# Patient Record
Sex: FEMALE | Race: WHITE | URBAN - METROPOLITAN AREA
[De-identification: names, ages, dates, MRNs, and addresses within clinical notes are randomized per-mention and may not be internally consistent; named-entity substitution may affect disease eponyms.]

---

## 2017-12-28 ENCOUNTER — IMPORTED ENCOUNTER (OUTPATIENT)
Dept: URBAN - METROPOLITAN AREA CLINIC 50 | Facility: CLINIC | Age: 72
End: 2017-12-28

## 2018-01-12 ENCOUNTER — IMPORTED ENCOUNTER (OUTPATIENT)
Dept: URBAN - METROPOLITAN AREA CLINIC 50 | Facility: CLINIC | Age: 73
End: 2018-01-12

## 2018-01-12 NOTE — PATIENT DISCUSSION
"""MRx given"" ""Informed patient that their cataract(s) are not visually significant or do not meet the criteria for cataract surgery.  Recommended attention to common cataract symptoms

## 2019-07-15 ENCOUNTER — IMPORTED ENCOUNTER (OUTPATIENT)
Dept: URBAN - METROPOLITAN AREA CLINIC 50 | Facility: CLINIC | Age: 74
End: 2019-07-15

## 2019-07-22 ENCOUNTER — IMPORTED ENCOUNTER (OUTPATIENT)
Dept: URBAN - METROPOLITAN AREA CLINIC 50 | Facility: CLINIC | Age: 74
End: 2019-07-22

## 2019-09-19 ENCOUNTER — IMPORTED ENCOUNTER (OUTPATIENT)
Dept: URBAN - METROPOLITAN AREA CLINIC 50 | Facility: CLINIC | Age: 74
End: 2019-09-19

## 2019-09-24 ENCOUNTER — IMPORTED ENCOUNTER (OUTPATIENT)
Dept: URBAN - METROPOLITAN AREA CLINIC 50 | Facility: CLINIC | Age: 74
End: 2019-09-24

## 2019-09-25 ENCOUNTER — IMPORTED ENCOUNTER (OUTPATIENT)
Dept: URBAN - METROPOLITAN AREA CLINIC 50 | Facility: CLINIC | Age: 74
End: 2019-09-25

## 2019-09-25 NOTE — PATIENT DISCUSSION
"""Recommend Bilateral Upper Lid Blepharoplasty 10/01/2019 at [facility].  Discussed with patient ENRIQUE

## 2019-10-09 ENCOUNTER — IMPORTED ENCOUNTER (OUTPATIENT)
Dept: URBAN - METROPOLITAN AREA CLINIC 50 | Facility: CLINIC | Age: 74
End: 2019-10-09

## 2019-11-14 ENCOUNTER — IMPORTED ENCOUNTER (OUTPATIENT)
Dept: URBAN - METROPOLITAN AREA CLINIC 50 | Facility: CLINIC | Age: 74
End: 2019-11-14

## 2020-03-04 ENCOUNTER — IMPORTED ENCOUNTER (OUTPATIENT)
Dept: URBAN - METROPOLITAN AREA CLINIC 50 | Facility: CLINIC | Age: 75
End: 2020-03-04

## 2020-03-11 ENCOUNTER — IMPORTED ENCOUNTER (OUTPATIENT)
Dept: URBAN - METROPOLITAN AREA CLINIC 50 | Facility: CLINIC | Age: 75
End: 2020-03-11

## 2020-07-17 ENCOUNTER — IMPORTED ENCOUNTER (OUTPATIENT)
Dept: URBAN - METROPOLITAN AREA CLINIC 50 | Facility: CLINIC | Age: 75
End: 2020-07-17

## 2021-04-17 ASSESSMENT — VISUAL ACUITY
OS_CC: 20/25-1
OD_SC: 20/60-1
OD_OTHER: 20/40. 20/40.
OD_OTHER: 20/60. 20/70.
OS_CC: 20/20-1
OD_CC: 20/25-1
OS_CC: J1+@ 16 IN
OS_CC: 20/25-
OS_CC: J2@ 18 IN
OD_CC: J2@ 18 IN
OS_CC: J1
OD_OTHER: 20/50. 20/70.
OD_CC: 20/20-2
OS_CC: 20/25
OS_OTHER: 20/50. >20/400.
OS_BAT: >20/400
OS_PH: 20/30
OD_CC: 20/25
OS_CC: 20/20-2
OD_BAT: 20/60
OS_CC: 20/20-1
OD_PH: 20/25
OD_CC: 20/30-
OS_OTHER: 20/50. 20/70.
OD_CC: 20/20
OS_BAT: 20/50
OD_CC: J1+@ 16 IN
OD_CC: 20/20-1
OS_PH: @ 18 IN
OD_BAT: 20/40
OD_CC: J16@ 18 IN
OD_CC: 20/20
OD_CC: J1
OD_PH: 20/40
OS_CC: 20/25
OS_BAT: 20/50
OD_BAT: 20/50
OD_PH: @ 18 IN
OS_CC: 20/25+1
OS_SC: 20/50
OS_OTHER: >20/400.
OD_CC: 20/25
OS_CC: J16@ 18 IN

## 2021-04-17 ASSESSMENT — TONOMETRY
OS_IOP_MMHG: 17
OD_IOP_MMHG: 16
OD_IOP_MMHG: 17
OS_IOP_MMHG: 17
OS_IOP_MMHG: 18
OS_IOP_MMHG: 19
OD_IOP_MMHG: 19
OS_IOP_MMHG: 16
OD_IOP_MMHG: 16
OS_IOP_MMHG: 16
OD_IOP_MMHG: 17
OD_IOP_MMHG: 17

## 2021-05-20 ENCOUNTER — PREPPED CHART (OUTPATIENT)
Dept: URBAN - METROPOLITAN AREA CLINIC 49 | Facility: CLINIC | Age: 76
End: 2021-05-20

## 2021-05-24 ENCOUNTER — COMPREHENSIVE EXAM (OUTPATIENT)
Dept: URBAN - METROPOLITAN AREA CLINIC 53 | Facility: CLINIC | Age: 76
End: 2021-05-24

## 2021-05-24 DIAGNOSIS — H43.813: ICD-10-CM

## 2021-05-24 DIAGNOSIS — H25.13: ICD-10-CM

## 2021-05-24 PROCEDURE — 92015 DETERMINE REFRACTIVE STATE: CPT

## 2021-05-24 PROCEDURE — 92014 COMPRE OPH EXAM EST PT 1/>: CPT

## 2021-05-24 ASSESSMENT — TONOMETRY
OS_IOP_MMHG: 15
OD_IOP_MMHG: 15

## 2021-05-24 ASSESSMENT — VISUAL ACUITY
OS_GLARE: >20/400
OD_GLARE: 20/200
OD_GLARE: 20/70
OU_CC: J2

## 2021-06-01 ENCOUNTER — BIOMETRY (OUTPATIENT)
Dept: URBAN - METROPOLITAN AREA CLINIC 53 | Facility: CLINIC | Age: 76
End: 2021-06-01

## 2021-06-01 DIAGNOSIS — H25.13: ICD-10-CM

## 2021-06-01 PROCEDURE — 92136 OPHTHALMIC BIOMETRY: CPT

## 2021-06-01 PROCEDURE — TOPOIOL CORNEAL TOPOGRAPHY-PREMIUM IOL

## 2021-06-01 ASSESSMENT — KERATOMETRY
OS_K2POWER_DIOPTERS: 42.37
OS_K1POWER_DIOPTERS: 43.12
OD_AXISANGLE_DEGREES: 024
OS_AXISANGLE_DEGREES: 114
OD_K2POWER_DIOPTERS: 42.25
OD_K1POWER_DIOPTERS: 42.37
OD_AXISANGLE2_DEGREES: 114
OS_AXISANGLE2_DEGREES: 24

## 2021-06-21 ENCOUNTER — PRE-OP - (OUTPATIENT)
Dept: URBAN - METROPOLITAN AREA CLINIC 53 | Facility: CLINIC | Age: 76
End: 2021-06-21

## 2021-06-21 DIAGNOSIS — H25.12: ICD-10-CM

## 2021-06-21 DIAGNOSIS — H43.813: ICD-10-CM

## 2021-06-21 PROCEDURE — 92134 CPTRZ OPH DX IMG PST SGM RTA: CPT

## 2021-06-21 PROCEDURE — PREOP PRE OP VISIT

## 2021-06-21 ASSESSMENT — KERATOMETRY
OD_AXISANGLE_DEGREES: 024
OS_AXISANGLE_DEGREES: 114
OD_K1POWER_DIOPTERS: 42.37
OS_AXISANGLE2_DEGREES: 24
OD_K2POWER_DIOPTERS: 42.25
OS_K1POWER_DIOPTERS: 43.12
OD_AXISANGLE2_DEGREES: 114
OS_K2POWER_DIOPTERS: 42.37

## 2021-06-21 ASSESSMENT — TONOMETRY
OS_IOP_MMHG: 16
OD_IOP_MMHG: 15

## 2021-06-21 ASSESSMENT — VISUAL ACUITY
OD_CC: 20/25
OS_CC: 20/25

## 2021-06-30 ENCOUNTER — SAME DAY PO (OUTPATIENT)
Dept: URBAN - METROPOLITAN AREA CLINIC 48 | Facility: CLINIC | Age: 76
End: 2021-06-30

## 2021-06-30 ENCOUNTER — SURGERY/PROCEDURE (OUTPATIENT)
Dept: URBAN - METROPOLITAN AREA SURGERY 16 | Facility: SURGERY | Age: 76
End: 2021-06-30

## 2021-06-30 DIAGNOSIS — Z96.1: ICD-10-CM

## 2021-06-30 DIAGNOSIS — Z98.42: ICD-10-CM

## 2021-06-30 DIAGNOSIS — H25.12: ICD-10-CM

## 2021-06-30 PROCEDURE — 99024 POSTOP FOLLOW-UP VISIT: CPT

## 2021-06-30 PROCEDURE — 66984 XCAPSL CTRC RMVL W/O ECP: CPT

## 2021-06-30 PROCEDURE — AAB00 SENSAR MONOFOCAL IOL – NO FEMTO

## 2021-06-30 ASSESSMENT — TONOMETRY: OS_IOP_MMHG: 13

## 2021-06-30 ASSESSMENT — KERATOMETRY
OS_AXISANGLE2_DEGREES: 24
OD_K2POWER_DIOPTERS: 42.25
OD_AXISANGLE_DEGREES: 024
OD_AXISANGLE2_DEGREES: 114
OS_AXISANGLE_DEGREES: 114
OS_AXISANGLE2_DEGREES: 24
OD_K1POWER_DIOPTERS: 42.37
OS_AXISANGLE_DEGREES: 114
OS_K1POWER_DIOPTERS: 43.12
OD_AXISANGLE2_DEGREES: 114
OD_K1POWER_DIOPTERS: 42.37
OD_AXISANGLE_DEGREES: 024
OS_K2POWER_DIOPTERS: 42.37
OS_K2POWER_DIOPTERS: 42.37
OS_K1POWER_DIOPTERS: 43.12
OD_K2POWER_DIOPTERS: 42.25

## 2021-06-30 ASSESSMENT — VISUAL ACUITY: OS_SC: 20/40

## 2021-07-08 ENCOUNTER — PRE OP - CE/IOL OD / 1 WEEK PO OS (OUTPATIENT)
Dept: URBAN - METROPOLITAN AREA CLINIC 48 | Facility: CLINIC | Age: 76
End: 2021-07-08

## 2021-07-08 DIAGNOSIS — H25.11: ICD-10-CM

## 2021-07-08 DIAGNOSIS — Z96.1: ICD-10-CM

## 2021-07-08 PROCEDURE — 92136 - 2N OPHTHALMIC BIOMETRY BY PARTIAL COHERENCE INTERFEROMETRY WITH INTRAOCULAR LENS POWER CALCULATION

## 2021-07-08 PROCEDURE — PREOP PRE OP VISIT

## 2021-07-08 ASSESSMENT — TONOMETRY
OS_IOP_MMHG: 16
OD_IOP_MMHG: 15

## 2021-07-08 ASSESSMENT — VISUAL ACUITY
OS_SC: 20/25
OD_CC: 20/30

## 2021-07-08 ASSESSMENT — KERATOMETRY
OD_K1POWER_DIOPTERS: 42.37
OS_K1POWER_DIOPTERS: 43.12
OS_K2POWER_DIOPTERS: 42.37
OS_AXISANGLE2_DEGREES: 24
OS_AXISANGLE_DEGREES: 114
OD_AXISANGLE2_DEGREES: 114
OD_K2POWER_DIOPTERS: 42.25
OD_AXISANGLE_DEGREES: 024

## 2021-07-08 NOTE — PATIENT DISCUSSION
CATARACT SURGERY PLANNER - STANDARD IOL/NO FEMTO: Phacoemulsification with IOL: Eye: OD|DOS: 7/14/21|Model: AAB00|Power: 20|Target: PLANO|Visc: Mai [place]leod: YES |Phaco Setting: DENSE.

## 2021-07-14 ENCOUNTER — SAME DAY PO (OUTPATIENT)
Dept: URBAN - METROPOLITAN AREA CLINIC 48 | Facility: CLINIC | Age: 76
End: 2021-07-14

## 2021-07-14 ENCOUNTER — SURGERY/PROCEDURE (OUTPATIENT)
Dept: URBAN - METROPOLITAN AREA SURGERY 16 | Facility: SURGERY | Age: 76
End: 2021-07-14

## 2021-07-14 DIAGNOSIS — Z96.1: ICD-10-CM

## 2021-07-14 DIAGNOSIS — Z98.41: ICD-10-CM

## 2021-07-14 DIAGNOSIS — H25.11: ICD-10-CM

## 2021-07-14 PROCEDURE — 99024 POSTOP FOLLOW-UP VISIT: CPT

## 2021-07-14 PROCEDURE — 66984 XCAPSL CTRC RMVL W/O ECP: CPT

## 2021-07-14 PROCEDURE — AAB00 SENSAR MONOFOCAL IOL – NO FEMTO

## 2021-07-14 ASSESSMENT — KERATOMETRY
OD_K2POWER_DIOPTERS: 42.25
OD_AXISANGLE2_DEGREES: 114
OD_K2POWER_DIOPTERS: 42.25
OD_AXISANGLE2_DEGREES: 114
OD_K1POWER_DIOPTERS: 42.37
OS_K1POWER_DIOPTERS: 43.12
OS_K1POWER_DIOPTERS: 43.12
OD_K1POWER_DIOPTERS: 42.37
OD_AXISANGLE_DEGREES: 024
OS_K2POWER_DIOPTERS: 42.37
OS_AXISANGLE_DEGREES: 114
OS_AXISANGLE2_DEGREES: 24
OD_AXISANGLE_DEGREES: 024
OS_AXISANGLE_DEGREES: 114
OS_AXISANGLE2_DEGREES: 24
OS_K2POWER_DIOPTERS: 42.37

## 2021-07-14 ASSESSMENT — TONOMETRY
OD_IOP_MMHG: 16
OD_IOP_MMHG: 34

## 2021-07-14 ASSESSMENT — VISUAL ACUITY: OD_SC: 20/40

## 2021-07-19 ENCOUNTER — 1 WEEK POST-OP (OUTPATIENT)
Dept: URBAN - METROPOLITAN AREA CLINIC 53 | Facility: CLINIC | Age: 76
End: 2021-07-19

## 2021-07-19 DIAGNOSIS — Z98.41: ICD-10-CM

## 2021-07-19 DIAGNOSIS — Z96.1: ICD-10-CM

## 2021-07-19 PROCEDURE — 99024 POSTOP FOLLOW-UP VISIT: CPT

## 2021-07-19 ASSESSMENT — KERATOMETRY
OS_K1POWER_DIOPTERS: 43.12
OD_AXISANGLE2_DEGREES: 114
OS_K2POWER_DIOPTERS: 42.37
OD_AXISANGLE_DEGREES: 024
OS_AXISANGLE2_DEGREES: 24
OS_AXISANGLE_DEGREES: 114
OD_K2POWER_DIOPTERS: 42.25
OD_K1POWER_DIOPTERS: 42.37

## 2021-07-19 ASSESSMENT — TONOMETRY
OS_IOP_MMHG: 18
OD_IOP_MMHG: 18

## 2021-07-19 ASSESSMENT — VISUAL ACUITY
OD_SC: 20/20
OS_SC: 20/20

## 2021-08-25 ENCOUNTER — 4 WEEK POST-OP (OUTPATIENT)
Dept: URBAN - METROPOLITAN AREA CLINIC 48 | Facility: CLINIC | Age: 76
End: 2021-08-25

## 2021-08-25 DIAGNOSIS — Z96.1: ICD-10-CM

## 2021-08-25 DIAGNOSIS — Z98.41: ICD-10-CM

## 2021-08-25 DIAGNOSIS — Z98.42: ICD-10-CM

## 2021-08-25 PROCEDURE — 99024 POSTOP FOLLOW-UP VISIT: CPT

## 2021-08-25 ASSESSMENT — TONOMETRY
OS_IOP_MMHG: 16
OD_IOP_MMHG: 16

## 2021-08-25 ASSESSMENT — KERATOMETRY
OS_AXISANGLE_DEGREES: 114
OD_AXISANGLE_DEGREES: 024
OS_AXISANGLE2_DEGREES: 24
OD_K2POWER_DIOPTERS: 42.25
OS_K1POWER_DIOPTERS: 43.12
OD_K1POWER_DIOPTERS: 42.37
OS_K2POWER_DIOPTERS: 42.37
OD_AXISANGLE2_DEGREES: 114

## 2021-08-25 ASSESSMENT — VISUAL ACUITY
OU_SC: 20/20
OD_SC: 20/20
OS_SC: 20/25

## 2021-11-15 ENCOUNTER — COMPREHENSIVE EXAM (OUTPATIENT)
Dept: URBAN - METROPOLITAN AREA CLINIC 53 | Facility: CLINIC | Age: 76
End: 2021-11-15

## 2021-11-15 DIAGNOSIS — Z96.1: ICD-10-CM

## 2021-11-15 DIAGNOSIS — H26.493: ICD-10-CM

## 2021-11-15 DIAGNOSIS — H43.813: ICD-10-CM

## 2021-11-15 PROCEDURE — 92014 COMPRE OPH EXAM EST PT 1/>: CPT

## 2021-11-15 ASSESSMENT — TONOMETRY
OS_IOP_MMHG: 16
OD_IOP_MMHG: 16

## 2021-11-15 ASSESSMENT — VISUAL ACUITY
OS_GLARE: 20/20
OD_SC: 20/20
OD_GLARE: 20/25
OD_GLARE: 20/20
OU_CC: J1+
OS_SC: 20/20
OS_GLARE: 20/25

## 2021-11-15 ASSESSMENT — KERATOMETRY
OD_K2POWER_DIOPTERS: 42.25
OS_K2POWER_DIOPTERS: 42.37
OD_K1POWER_DIOPTERS: 42.37
OD_AXISANGLE_DEGREES: 024
OS_K1POWER_DIOPTERS: 43.12
OD_AXISANGLE2_DEGREES: 114
OS_AXISANGLE_DEGREES: 114
OS_AXISANGLE2_DEGREES: 24

## 2022-11-17 ENCOUNTER — COMPREHENSIVE EXAM (OUTPATIENT)
Dept: URBAN - METROPOLITAN AREA CLINIC 48 | Facility: LOCATION | Age: 77
End: 2022-11-17

## 2022-11-17 DIAGNOSIS — H52.4: ICD-10-CM

## 2022-11-17 DIAGNOSIS — H04.123: ICD-10-CM

## 2022-11-17 DIAGNOSIS — H26.493: ICD-10-CM

## 2022-11-17 DIAGNOSIS — H43.813: ICD-10-CM

## 2022-11-17 PROCEDURE — 92014 COMPRE OPH EXAM EST PT 1/>: CPT

## 2022-11-17 PROCEDURE — 92015 DETERMINE REFRACTIVE STATE: CPT

## 2022-11-17 ASSESSMENT — VISUAL ACUITY
OS_GLARE: 20/60
OS_CC: 20/25
OU_CC: J1 @ 18 IN
OS_GLARE: 20/40
OD_GLARE: 20/40
OD_GLARE: 20/40
OD_CC: 20/25

## 2022-11-17 ASSESSMENT — TONOMETRY
OS_IOP_MMHG: 16
OD_IOP_MMHG: 14

## 2022-11-17 ASSESSMENT — KERATOMETRY
OD_K2POWER_DIOPTERS: 42.25
OD_K1POWER_DIOPTERS: 42.37
OS_AXISANGLE2_DEGREES: 24
OS_AXISANGLE_DEGREES: 114
OD_AXISANGLE_DEGREES: 024
OS_K2POWER_DIOPTERS: 42.37
OD_AXISANGLE2_DEGREES: 114
OS_K1POWER_DIOPTERS: 43.12

## 2023-11-22 NOTE — PATIENT DISCUSSION
Patient is doing well post-operatively. The patient has been re-examined and I agree with the above assessment or I updated with my findings.

## 2023-12-14 ENCOUNTER — COMPREHENSIVE EXAM (OUTPATIENT)
Dept: URBAN - METROPOLITAN AREA CLINIC 48 | Facility: LOCATION | Age: 78
End: 2023-12-14

## 2023-12-14 DIAGNOSIS — H43.813: ICD-10-CM

## 2023-12-14 DIAGNOSIS — H52.4: ICD-10-CM

## 2023-12-14 DIAGNOSIS — H04.123: ICD-10-CM

## 2023-12-14 DIAGNOSIS — H26.493: ICD-10-CM

## 2023-12-14 PROCEDURE — 92015 DETERMINE REFRACTIVE STATE: CPT

## 2023-12-14 PROCEDURE — 92014 COMPRE OPH EXAM EST PT 1/>: CPT

## 2023-12-14 ASSESSMENT — KERATOMETRY
OS_AXISANGLE2_DEGREES: 125
OD_AXISANGLE_DEGREES: 180
OD_K2POWER_DIOPTERS: 42.00
OS_AXISANGLE_DEGREES: 035
OD_AXISANGLE2_DEGREES: 90
OS_K2POWER_DIOPTERS: 42.75
OS_K1POWER_DIOPTERS: 42.25
OD_K1POWER_DIOPTERS: 42.25

## 2023-12-14 ASSESSMENT — VISUAL ACUITY
OS_CC: 20/20-1
OU_CC: J1
OD_CC: 20/20-2

## 2023-12-14 ASSESSMENT — TONOMETRY
OS_IOP_MMHG: 15
OD_IOP_MMHG: 14

## 2025-01-25 NOTE — PATIENT DISCUSSION
CATARACT SURGERY PLANNER - STANDARD IOL/NO FEMTO: Phacoemulsification with IOL: Eye: left|DOS: 6/30/21|Model: AAB00|Power: 20|Visc: duet|O[place]: yes|10% Phenylephrine: no|Epi-shugarcaine: no|Phaco Setting: dense|BSS+: no|Trypan Blue: no|CTR: no|Olive Tip: no|Atropine: no|Pupilloplasty: no|Notes: no.
LENS OPTION (STANDARD): Discussed with patient in detail all available methods and lens options as well as their associated benefits, limitations and out-of-pocket costs. Patient chooses standard cataract surgery with monofocal lens implant and understands that reading glasses will still be needed after surgery. The patient also understands that with any IOL there is no guarantee that they will not require glasses to see their best at any distance after surgery. The risks, benefits and alternatives to surgery were explained and all questions were answered.
Recommended observation.
Retinal tear and detachment warning symptoms reviewed and patient instructed to call immediately if increasing floaters, flashes, or decreasing peripheral vision.
80

## 2025-07-31 ENCOUNTER — COMPREHENSIVE EXAM (OUTPATIENT)
Age: 80
End: 2025-07-31

## 2025-07-31 DIAGNOSIS — H53.8: ICD-10-CM

## 2025-07-31 DIAGNOSIS — H52.4: ICD-10-CM

## 2025-07-31 DIAGNOSIS — H40.041: ICD-10-CM

## 2025-07-31 DIAGNOSIS — H26.493: ICD-10-CM

## 2025-07-31 DIAGNOSIS — H04.123: ICD-10-CM

## 2025-07-31 DIAGNOSIS — H43.813: ICD-10-CM

## 2025-07-31 PROCEDURE — 92015 DETERMINE REFRACTIVE STATE: CPT

## 2025-07-31 PROCEDURE — 99214 OFFICE O/P EST MOD 30 MIN: CPT
